# Patient Record
Sex: FEMALE | Race: WHITE | NOT HISPANIC OR LATINO | Employment: FULL TIME | URBAN - METROPOLITAN AREA
[De-identification: names, ages, dates, MRNs, and addresses within clinical notes are randomized per-mention and may not be internally consistent; named-entity substitution may affect disease eponyms.]

---

## 2018-12-22 ENCOUNTER — APPOINTMENT (OUTPATIENT)
Dept: RADIOLOGY | Facility: MEDICAL CENTER | Age: 23
End: 2018-12-22
Attending: EMERGENCY MEDICINE
Payer: COMMERCIAL

## 2018-12-22 ENCOUNTER — HOSPITAL ENCOUNTER (EMERGENCY)
Facility: MEDICAL CENTER | Age: 23
End: 2018-12-22
Attending: EMERGENCY MEDICINE
Payer: COMMERCIAL

## 2018-12-22 VITALS
RESPIRATION RATE: 18 BRPM | WEIGHT: 141.93 LBS | TEMPERATURE: 98 F | SYSTOLIC BLOOD PRESSURE: 97 MMHG | DIASTOLIC BLOOD PRESSURE: 65 MMHG | BODY MASS INDEX: 22.28 KG/M2 | OXYGEN SATURATION: 96 % | HEART RATE: 81 BPM | HEIGHT: 67 IN

## 2018-12-22 DIAGNOSIS — M25.532 LEFT WRIST PAIN: ICD-10-CM

## 2018-12-22 DIAGNOSIS — T07.XXXA MULTIPLE CONTUSIONS: ICD-10-CM

## 2018-12-22 PROCEDURE — 73110 X-RAY EXAM OF WRIST: CPT | Mod: LT

## 2018-12-22 PROCEDURE — 700102 HCHG RX REV CODE 250 W/ 637 OVERRIDE(OP): Performed by: EMERGENCY MEDICINE

## 2018-12-22 PROCEDURE — 99284 EMERGENCY DEPT VISIT MOD MDM: CPT

## 2018-12-22 PROCEDURE — A9270 NON-COVERED ITEM OR SERVICE: HCPCS | Performed by: EMERGENCY MEDICINE

## 2018-12-22 RX ORDER — ACETAMINOPHEN 325 MG/1
650 TABLET ORAL ONCE
Status: COMPLETED | OUTPATIENT
Start: 2018-12-22 | End: 2018-12-22

## 2018-12-22 RX ORDER — ESCITALOPRAM OXALATE 10 MG/1
10 TABLET ORAL DAILY
COMMUNITY
End: 2019-01-07

## 2018-12-22 RX ADMIN — ACETAMINOPHEN 650 MG: 325 TABLET, FILM COATED ORAL at 21:53

## 2018-12-22 ASSESSMENT — PAIN DESCRIPTION - DESCRIPTORS: DESCRIPTORS: THROBBING

## 2018-12-23 ASSESSMENT — ENCOUNTER SYMPTOMS
FOCAL WEAKNESS: 0
FEVER: 0
LOSS OF CONSCIOUSNESS: 0
SENSORY CHANGE: 0

## 2018-12-23 NOTE — ED NOTES
Patient ambulatory to room with steady gait. Bilateral forearms are bruised and scratched. No puncture wounds or bite marks noted. A+OX4.

## 2018-12-23 NOTE — ED TRIAGE NOTES
Chief Complaint   Patient presents with   • Dog Bite     Pt broke up dog fight today and has multiple bruises and scratches from dog bites on B UES.  C/O worst pain at L wrist up to tip of thumb.  L UE has one open scratch.  Diffuse bruising on both arms.     Pt amb to triage with steady gait for above complaint.   Pt is alert and oriented, speaking in full sentences, follows commands and responds appropriately to questions. NAD. Resp are even and unlabored.  Pt placed in lobby. Pt educated on triage process. Pt encouraged to alert staff for any changes.

## 2018-12-23 NOTE — ED PROVIDER NOTES
"ED Provider Note   12/22/2018  9:37 PM    Means of Arrival: Walk In  History obtained by: patient  Limitations:     CHIEF COMPLAINT  Chief Complaint   Patient presents with   • Dog Bite     Pt broke up dog fight today and has multiple bruises and scratches from dog bites on B UES.  C/O worst pain at L wrist up to tip of thumb.  L UE has one open scratch.  Diffuse bruising on both arms.       HPI  Peg Hdez Son is a 23 y.o. female previously healthy presenting with concerns of left wrist pain after breaking up a dog fight.  She says she was wearing a jacket is covering her skin when she broke up the dog fight.  She has multiple bruising on her arms but no significant wounds.  Her main reason for coming to the emergency room is because she is having shooting pains at her left wrist that radiate to her left thumb.    REVIEW OF SYSTEMS  Review of Systems   Constitutional: Negative for fever.   Skin:        Bruising, no open wounds   Neurological: Negative for sensory change, focal weakness and loss of consciousness.   All other systems reviewed and are negative.    See HPI for further details.     PAST MEDICAL HISTORY   has a past medical history of Anxiety and Depression.  She says vaccines are up-to-date  SOCIAL HISTORY  Social History     Social History Main Topics   • Smoking status: Current Every Day Smoker     Packs/day: 0.50   • Smokeless tobacco: Former User   • Alcohol use Yes      Comment: occ   • Drug use: Yes     Types: Inhaled      Comment: marijuana occ   • Sexual activity: Not on file       SURGICAL HISTORY  patient denies any surgical history    CURRENT MEDICATIONS  Home Medications     Reviewed by Rosalie Medina R.N. (Registered Nurse) on 12/22/18 at 2121  Med List Status: Complete   Medication Last Dose Status   escitalopram (LEXAPRO) 10 MG Tab 12/21/2018 Active                ALLERGIES  Allergies   Allergen Reactions   • Penicillamine Rash     \"skin peels off\"       PHYSICAL EXAM  VITAL SIGNS: " "BP (!) 97/65   Pulse 81   Temp 36.7 °C (98 °F) (Temporal)   Resp 18   Ht 1.702 m (5' 7\")   Wt 64.4 kg (141 lb 14.9 oz)   LMP 12/01/2018 (Approximate)   SpO2 96%   BMI 22.23 kg/m²    Pulse ox interpretation: I interpret this pulse ox as normal.  Constitutional: Alert in no apparent distress.  HENT: Normocephalic, Atraumatic, Bilateral external ears normal. Nose normal.   Eyes: Pupils are equal. Conjunctiva normal, non-icteric.   Heart: Regular rate and rythm, no murmurs.    Lungs: No respiratory distress, regular respirations. Clear to auscultation bilaterally.  Abdomen: Normal appearance, nondistended, nontender.  Skin: See muscular skeletal exam  Neurologic: Alert, see muscular skeletal exam.  MSK: Bilateral arms with multiple contusions, quite extensive.  There are no breaks in the skin.  She has tenderness at her left wrist.  Pain is reproducible with flexion and extension.  She does have full range of motion no signs of tendon disruption.  There are no neuro deficits in her hand.  She is able to make a normal fist and she has good sensation.  Brisk capillary refill in all digits.  2+ radial and ulnar pulses.  Psychiatric: Affect normal, Judgment normal, Mood normal, Appears appropriate and not intoxicated.   Physical Exam      COURSE & MEDICAL DECISION MAKING  Pertinent Labs & Imaging studies reviewed. (See chart for details)    9:37 PM This is an emergent evaluation of a 23 y.o., female who presents with pain in her left wrist after breaking up a dog fight.  Her exam reveals quite extensive contusions to bilateral forearms as she says is from dog bites.  She had an hour later on which would explain why there are no breaks in the skin.  I do not think that she needs empiric antibiotics because there are no skin breaks, significant abrasions.  We did perform an x-ray of the left wrist looking for signs of fracture, dislocation.  X-ray was negative for these findings.  I also assessed her wrist with " concerns for possible tendon injury however there is no signs of this on physical exam.  She was given Tylenol for pain.  I went over reasons for her to return the emergency department which would include signs of cellulitis/infection.    The patient will not drink alcohol nor drive with prescribed medications. The patient will return for worsening symptoms and is stable at the time of discharge. The patient verbalizes understanding.    FINAL IMPRESSION  1. Multiple contusions               Electronically signed by: Bart Rico II, 12/22/2018 9:37 PM

## 2018-12-23 NOTE — ED NOTES
D/C home with written and verbal instructions re: Rx, activity, f/u.  Verbalizes understanding.  Ambulated out with steady gait with friend.

## 2019-01-07 ENCOUNTER — HOSPITAL ENCOUNTER (EMERGENCY)
Facility: MEDICAL CENTER | Age: 24
End: 2019-01-07
Attending: EMERGENCY MEDICINE
Payer: COMMERCIAL

## 2019-01-07 VITALS
OXYGEN SATURATION: 99 % | DIASTOLIC BLOOD PRESSURE: 75 MMHG | TEMPERATURE: 98.1 F | WEIGHT: 143.3 LBS | RESPIRATION RATE: 20 BRPM | HEART RATE: 86 BPM | BODY MASS INDEX: 22.49 KG/M2 | HEIGHT: 67 IN | SYSTOLIC BLOOD PRESSURE: 123 MMHG

## 2019-01-07 DIAGNOSIS — J40 BRONCHITIS: ICD-10-CM

## 2019-01-07 PROCEDURE — 99283 EMERGENCY DEPT VISIT LOW MDM: CPT

## 2019-01-07 RX ORDER — BENZONATATE 100 MG/1
100 CAPSULE ORAL 3 TIMES DAILY PRN
Qty: 20 CAP | Refills: 0 | Status: SHIPPED | OUTPATIENT
Start: 2019-01-07 | End: 2019-04-25

## 2019-01-07 RX ORDER — AZITHROMYCIN 250 MG/1
TABLET, FILM COATED ORAL
Qty: 6 TAB | Refills: 0 | Status: SHIPPED | OUTPATIENT
Start: 2019-01-07 | End: 2019-04-25

## 2019-01-07 ASSESSMENT — PAIN SCALES - GENERAL: PAINLEVEL_OUTOF10: 5

## 2019-01-07 NOTE — ED PROVIDER NOTES
"ED Provider Note    CHIEF COMPLAINT  Chief Complaint   Patient presents with   • Cough     non productive cough X1 week       HPI  Peg Hdez Son is a 23 y.o. female who presents to the emergency department complaining of 1 week of progressively worsening cough scratchy voice body aches and on and off fever.  The patient's fiancé is also ill with similar symptoms.  The patient does smoke and says she has had a history of bronchitis in the past but she says because of the way she is feeling she has stopped smoking over the last couple of days.    REVIEW OF SYSTEMS no chest pain no hemoptysis no vomiting or diarrhea.  All other systems negative    PAST MEDICAL HISTORY  Past Medical History:   Diagnosis Date   • Anxiety    • Depression        FAMILY HISTORY  History reviewed. No pertinent family history.    SOCIAL HISTORY  Social History     Social History   • Marital status: Single     Spouse name: N/A   • Number of children: N/A   • Years of education: N/A     Social History Main Topics   • Smoking status: Current Every Day Smoker     Packs/day: 0.50   • Smokeless tobacco: Former User   • Alcohol use Yes      Comment: occ   • Drug use: Yes     Types: Inhaled      Comment: marijuana occ   • Sexual activity: Not on file     Other Topics Concern   • Not on file     Social History Narrative   • No narrative on file       SURGICAL HISTORY  History reviewed. No pertinent surgical history.    CURRENT MEDICATIONS  Home Medications    **Home medications have not yet been reviewed for this encounter**         ALLERGIES  Allergies   Allergen Reactions   • Penicillamine Rash     \"skin peels off\"       PHYSICAL EXAM  VITAL SIGNS: /75   Pulse 86   Temp 36.7 °C (98.1 °F) (Temporal)   Resp 20   Ht 1.702 m (5' 7\")   Wt 65 kg (143 lb 4.8 oz)   LMP 12/07/2018 (Approximate)   SpO2 99%   BMI 22.44 kg/m²    Oxygen saturation is interpreted as adequate  Constitutional: Awake and nontoxic-appearing  HENT: Mucous membranes " are moist throat clear, the patient does have a scratchy voice  Eyes: No erythema discharge or jaundice  Neck: Trachea midline no JVD no meningeal findings  Cardiovascular: Regular rate and rhythm  Lungs: Clear and equal bilaterally with no apparent difficulty breathing  Skin: Warm and dry  Musculoskeletal: No acute bony deformity  Neurologic: Awake verbal ambulatory    MEDICAL DECISION MAKING and DISPOSITION  I have reviewed the file versus bacterial etiology of illness with the patient, I have offered to test her for influenza.  After some discussion we have decided to treat her empirically with azithromycin and Tessalon.  I have written prescriptions for her I have advised her to rest at home and drink lots of fluids to maintain hydration she is to use Tylenol and Motrin if needed for fever or discomfort and she is to return here if she develops new or worsening symptoms    IMPRESSION  1.  Acute bronchitis      Electronically signed by: Lucio Leiva, 1/7/2019 10:23 AM

## 2019-01-07 NOTE — ED NOTES
Discharged to home with instructions to follow up with her doctor and/or return here for worsening symptom.  Prescriptions given to patient

## 2019-01-07 NOTE — ED NOTES
Ambulatory to room.  Agree with triage assessment. Cough accompanied by headache, worse when coughing.  Fiance recently Dx with viral URI.  No acute distress. Changed into gown.  Chart placed for ERP eval.

## 2019-01-07 NOTE — DISCHARGE INSTRUCTIONS
Rest at home, drink lots of fluids to maintain hydration, use Tylenol and Motrin if needed for fever or discomfort.  Return here if you are developing new or worsening symptoms.  Stop smoking.

## 2019-03-06 ENCOUNTER — TELEPHONE (OUTPATIENT)
Dept: SCHEDULING | Facility: IMAGING CENTER | Age: 24
End: 2019-03-06

## 2019-04-25 ENCOUNTER — OFFICE VISIT (OUTPATIENT)
Dept: MEDICAL GROUP | Facility: MEDICAL CENTER | Age: 24
End: 2019-04-25
Payer: COMMERCIAL

## 2019-04-25 VITALS
WEIGHT: 149 LBS | BODY MASS INDEX: 23.39 KG/M2 | DIASTOLIC BLOOD PRESSURE: 72 MMHG | HEART RATE: 76 BPM | RESPIRATION RATE: 16 BRPM | OXYGEN SATURATION: 98 % | TEMPERATURE: 98.4 F | SYSTOLIC BLOOD PRESSURE: 122 MMHG | HEIGHT: 67 IN

## 2019-04-25 DIAGNOSIS — Z80.49 FAMILY HISTORY OF CERVICAL CANCER: ICD-10-CM

## 2019-04-25 DIAGNOSIS — F33.42 RECURRENT MAJOR DEPRESSIVE DISORDER, IN FULL REMISSION (HCC): ICD-10-CM

## 2019-04-25 DIAGNOSIS — Z84.81 FAMILY HISTORY OF BRCA GENE POSITIVE: ICD-10-CM

## 2019-04-25 DIAGNOSIS — G43.119 INTRACTABLE MIGRAINE WITH AURA WITHOUT STATUS MIGRAINOSUS: ICD-10-CM

## 2019-04-25 DIAGNOSIS — Z00.00 ANNUAL PHYSICAL EXAM: ICD-10-CM

## 2019-04-25 DIAGNOSIS — F41.8 DEPRESSION WITH ANXIETY: ICD-10-CM

## 2019-04-25 DIAGNOSIS — Z80.3 FAMILY HISTORY OF BREAST CANCER IN MOTHER: ICD-10-CM

## 2019-04-25 PROBLEM — F32.9 MAJOR DEPRESSIVE DISORDER: Status: ACTIVE | Noted: 2019-04-25

## 2019-04-25 PROCEDURE — 99204 OFFICE O/P NEW MOD 45 MIN: CPT | Performed by: NURSE PRACTITIONER

## 2019-04-25 RX ORDER — SUMATRIPTAN 50 MG/1
50 TABLET, FILM COATED ORAL
Qty: 10 TAB | Refills: 3 | Status: SHIPPED | OUTPATIENT
Start: 2019-04-25

## 2019-04-25 RX ORDER — ESCITALOPRAM OXALATE 10 MG/1
10 TABLET ORAL DAILY
Qty: 30 TAB | Refills: 11 | Status: SHIPPED | OUTPATIENT
Start: 2019-04-25

## 2019-04-25 RX ORDER — KETOROLAC TROMETHAMINE 30 MG/ML
60 INJECTION, SOLUTION INTRAMUSCULAR; INTRAVENOUS ONCE
Status: COMPLETED | OUTPATIENT
Start: 2019-04-25 | End: 2019-04-25

## 2019-04-25 RX ORDER — ESCITALOPRAM OXALATE 10 MG/1
10 TABLET ORAL DAILY
COMMUNITY
End: 2019-04-25 | Stop reason: SDUPTHER

## 2019-04-25 RX ADMIN — KETOROLAC TROMETHAMINE 60 MG: 30 INJECTION, SOLUTION INTRAMUSCULAR; INTRAVENOUS at 09:49

## 2019-04-25 ASSESSMENT — PATIENT HEALTH QUESTIONNAIRE - PHQ9
CLINICAL INTERPRETATION OF PHQ2 SCORE: 2
SUM OF ALL RESPONSES TO PHQ QUESTIONS 1-9: 17
5. POOR APPETITE OR OVEREATING: 2 - MORE THAN HALF THE DAYS

## 2019-04-25 NOTE — PROGRESS NOTES
Peg Del Angel is a 24 y.o. female here to establish care and discuss the following:  HPI:    Intractable migraine with aura without status migrainosus  Started about 1 month ago. Occurring daily, lasting a few hours. Taking Ibuprofen 800 mg once, slightly improves. Symptoms include photophobia, phonophobia, nausea no vomiting, occ paresthesias, occ floaters in vision. Does get nausea and floaters/flashing prior to headache.     Did have Chronic migraines from age [age] due to a chemical exposure, feels like they decreased or stopped up until recently.     Does get neck and shoulder tension, occ dizziness.     Family history of BRCA gene positive  Mother was diagnosed with BRCA + breast cancer and cervical cancer in her 20s-30s. Patient has not had testing.     Depression with anxiety  Chronic. Stable on escitalopram 10mg daily. Requesting refills today.     Current medicines (including changes today)  Current Outpatient Prescriptions   Medication Sig Dispense Refill   • SUMAtriptan (IMITREX) 50 MG Tab Take 1 Tab by mouth Once PRN for Migraine for up to 1 dose. May repeat dose once if symptoms persist past 2 hours 10 Tab 3   • escitalopram (LEXAPRO) 10 MG Tab Take 1 Tab by mouth every day. 30 Tab 11     No current facility-administered medications for this visit.      She  has a past medical history of Anxiety and Depression.  She  has no past surgical history on file.  Social History   Substance Use Topics   • Smoking status: Current Every Day Smoker     Packs/day: 0.25     Years: 5.00   • Smokeless tobacco: Former User   • Alcohol use Yes      Comment: OCC 1/month, previously heavy     Social History     Social History Narrative   • No narrative on file     Family History   Problem Relation Age of Onset   • Cancer Mother         breast (20-30), cervical (20-30) + BRCA   • Heart Disease Mother    • Stroke Mother    • Seizures Mother    • Other Father         joints   • Blood Disease Father         Hemachromatosis  "  • Other Sister         CP   • Lung Disease Brother    • Psychiatry Brother      Family Status   Relation Status   • Mo Alive   • Fa Alive   • Sis Alive   • Bro Alive         ROS  No chest pain, no abdominal pain, no rash.  Positive ROS as per HPI.  All other systems reviewed and are negative      Objective:     /72 (BP Location: Right arm, Patient Position: Sitting, BP Cuff Size: Adult)   Pulse 76   Temp 36.9 °C (98.4 °F) (Temporal)   Resp 16   Ht 1.702 m (5' 7.01\")   Wt 67.6 kg (149 lb)   SpO2 98%  Body mass index is 23.33 kg/m².     Physical Exam:    Constitutional: Alert, no distress.  Skin: Warm, dry, good turgor, no rashes in visible areas.  Eye: Equal, round, conjunctiva clear, lids normal.  ENMT: Lips without lesions, good dentition  Neck: Trachea midline  Respiratory: Unlabored respiratory effort, lungs clear to auscultation, no wheezes, no ronchi.  Cardiovascular: Normal S1, S2, no murmur, no edema.  Psych: Alert and oriented x3, normal affect and mood.      Assessment and Plan:   The following treatment plan was discussed    1. Intractable migraine with aura without status migrainosus  Unstable  toradol IM 60 mg today  Trial of imitrex as needed  If frequent migraines persist, consider preventative mediation  - SUMAtriptan (IMITREX) 50 MG Tab; Take 1 Tab by mouth Once PRN for Migraine for up to 1 dose. May repeat dose once if symptoms persist past 2 hours  Dispense: 10 Tab; Refill: 3  - ketorolac (TORADOL) injection 60 mg; 2 mL by Intramuscular route Once.    2. Depression with anxiety  Stable  Medication refilled  - escitalopram (LEXAPRO) 10 MG Tab; Take 1 Tab by mouth every day.  Dispense: 30 Tab; Refill: 11  - Comp Metabolic Panel; Future  - TSH WITH REFLEX TO FT4; Future    3. Family history of BRCA gene positive  - BRCA1 AND BRCA2)SEQUENCING AND DELETION/DUPLICATION; Future  - MA-SCREEN MAMMO W/CAD-BILAT; Future    4. Family history of breast cancer in mother  - BRCA1 AND " BRCA2)SEQUENCING AND DELETION/DUPLICATION; Future  - MA-SCREEN MAMMO W/CAD-BILAT; Future    5. Family history of cervical cancer  - BRCA1 AND BRCA2)SEQUENCING AND DELETION/DUPLICATION; Future    6. Annual physical exam  - CBC WITH DIFFERENTIAL; Future  - Comp Metabolic Panel; Future  - Lipid Profile; Future  - TSH WITH REFLEX TO FT4; Future  - VITAMIN D,25 HYDROXY; Future      Followup: Return in about 4 weeks (around 5/23/2019) for Annual, Review Labs.    I have placed the below orders and discussed them with an approved delegating provider. The MA is performing the below orders under the direction of Dr. Tay

## 2019-04-25 NOTE — ASSESSMENT & PLAN NOTE
Mother was diagnosed with BRCA + breast cancer and cervical cancer in her 20s-30s. Patient has not had testing.

## 2019-04-25 NOTE — ASSESSMENT & PLAN NOTE
Started about 1 month ago. Occurring daily, lasting a few hours. Taking Ibuprofen 800 mg once, slightly improves. Symptoms include photophobia, phonophobia, nausea no vomiting, occ paresthesias, occ floaters in vision. Does get nausea and floaters/flashing prior to headache.     Did have Chronic migraines from age [age] due to a chemical exposure, feels like they decreased or stopped up until recently.     Does get neck and shoulder tension, occ dizziness.

## 2019-05-01 ENCOUNTER — HOSPITAL ENCOUNTER (OUTPATIENT)
Dept: LAB | Facility: MEDICAL CENTER | Age: 24
End: 2019-05-01
Attending: NURSE PRACTITIONER
Payer: COMMERCIAL

## 2019-05-01 DIAGNOSIS — F41.8 DEPRESSION WITH ANXIETY: ICD-10-CM

## 2019-05-01 DIAGNOSIS — Z00.00 ANNUAL PHYSICAL EXAM: ICD-10-CM

## 2019-05-01 LAB
25(OH)D3 SERPL-MCNC: 12 NG/ML (ref 30–100)
ALBUMIN SERPL BCP-MCNC: 4.1 G/DL (ref 3.2–4.9)
ALBUMIN/GLOB SERPL: 1.6 G/DL
ALP SERPL-CCNC: 33 U/L (ref 30–99)
ALT SERPL-CCNC: 8 U/L (ref 2–50)
ANION GAP SERPL CALC-SCNC: 8 MMOL/L (ref 0–11.9)
AST SERPL-CCNC: 16 U/L (ref 12–45)
BASOPHILS # BLD AUTO: 0.5 % (ref 0–1.8)
BASOPHILS # BLD: 0.02 K/UL (ref 0–0.12)
BILIRUB SERPL-MCNC: 0.8 MG/DL (ref 0.1–1.5)
BUN SERPL-MCNC: 8 MG/DL (ref 8–22)
CALCIUM SERPL-MCNC: 8.8 MG/DL (ref 8.5–10.5)
CHLORIDE SERPL-SCNC: 109 MMOL/L (ref 96–112)
CHOLEST SERPL-MCNC: 136 MG/DL (ref 100–199)
CO2 SERPL-SCNC: 24 MMOL/L (ref 20–33)
CREAT SERPL-MCNC: 0.74 MG/DL (ref 0.5–1.4)
EOSINOPHIL # BLD AUTO: 0.08 K/UL (ref 0–0.51)
EOSINOPHIL NFR BLD: 1.9 % (ref 0–6.9)
ERYTHROCYTE [DISTWIDTH] IN BLOOD BY AUTOMATED COUNT: 45.9 FL (ref 35.9–50)
FASTING STATUS PATIENT QL REPORTED: NORMAL
GLOBULIN SER CALC-MCNC: 2.6 G/DL (ref 1.9–3.5)
GLUCOSE SERPL-MCNC: 93 MG/DL (ref 65–99)
HCT VFR BLD AUTO: 40.5 % (ref 37–47)
HDLC SERPL-MCNC: 52 MG/DL
HGB BLD-MCNC: 13.5 G/DL (ref 12–16)
IMM GRANULOCYTES # BLD AUTO: 0.01 K/UL (ref 0–0.11)
IMM GRANULOCYTES NFR BLD AUTO: 0.2 % (ref 0–0.9)
LDLC SERPL CALC-MCNC: 75 MG/DL
LYMPHOCYTES # BLD AUTO: 0.95 K/UL (ref 1–4.8)
LYMPHOCYTES NFR BLD: 22.7 % (ref 22–41)
MCH RBC QN AUTO: 31.1 PG (ref 27–33)
MCHC RBC AUTO-ENTMCNC: 33.3 G/DL (ref 33.6–35)
MCV RBC AUTO: 93.3 FL (ref 81.4–97.8)
MONOCYTES # BLD AUTO: 0.27 K/UL (ref 0–0.85)
MONOCYTES NFR BLD AUTO: 6.4 % (ref 0–13.4)
NEUTROPHILS # BLD AUTO: 2.86 K/UL (ref 2–7.15)
NEUTROPHILS NFR BLD: 68.3 % (ref 44–72)
NRBC # BLD AUTO: 0 K/UL
NRBC BLD-RTO: 0 /100 WBC
PLATELET # BLD AUTO: 155 K/UL (ref 164–446)
PMV BLD AUTO: 11.1 FL (ref 9–12.9)
POTASSIUM SERPL-SCNC: 4.2 MMOL/L (ref 3.6–5.5)
PROT SERPL-MCNC: 6.7 G/DL (ref 6–8.2)
RBC # BLD AUTO: 4.34 M/UL (ref 4.2–5.4)
SODIUM SERPL-SCNC: 141 MMOL/L (ref 135–145)
TRIGL SERPL-MCNC: 44 MG/DL (ref 0–149)
TSH SERPL DL<=0.005 MIU/L-ACNC: 0.95 UIU/ML (ref 0.38–5.33)
WBC # BLD AUTO: 4.2 K/UL (ref 4.8–10.8)

## 2019-05-01 PROCEDURE — 36415 COLL VENOUS BLD VENIPUNCTURE: CPT

## 2019-05-01 PROCEDURE — 85025 COMPLETE CBC W/AUTO DIFF WBC: CPT

## 2019-05-01 PROCEDURE — 80061 LIPID PANEL: CPT

## 2019-05-01 PROCEDURE — 80053 COMPREHEN METABOLIC PANEL: CPT

## 2019-05-01 PROCEDURE — 82306 VITAMIN D 25 HYDROXY: CPT

## 2019-05-01 PROCEDURE — 84443 ASSAY THYROID STIM HORMONE: CPT

## 2019-05-02 ENCOUNTER — TELEPHONE (OUTPATIENT)
Dept: MEDICAL GROUP | Facility: MEDICAL CENTER | Age: 24
End: 2019-05-02

## 2019-06-04 ENCOUNTER — HOSPITAL ENCOUNTER (OUTPATIENT)
Dept: RADIOLOGY | Facility: MEDICAL CENTER | Age: 24
End: 2019-06-04
Attending: NURSE PRACTITIONER
Payer: COMMERCIAL

## 2019-06-04 DIAGNOSIS — Z84.81 FAMILY HISTORY OF BRCA GENE POSITIVE: ICD-10-CM

## 2019-06-04 DIAGNOSIS — Z80.3 FAMILY HISTORY OF BREAST CANCER IN MOTHER: ICD-10-CM

## 2019-06-04 PROCEDURE — 77067 SCR MAMMO BI INCL CAD: CPT

## 2019-06-12 ENCOUNTER — TELEPHONE (OUTPATIENT)
Dept: MEDICAL GROUP | Facility: MEDICAL CENTER | Age: 24
End: 2019-06-12

## 2019-06-13 NOTE — TELEPHONE ENCOUNTER
----- Message from RONALD Briceño sent at 6/12/2019  4:55 PM PDT -----  Patient's mammogram is normal but shows dense breast tissue. That makes it difficult to detect small abnormalities with a normal mammogram. There is another test- sonocine- that is better for women with dense breasts. This, unfortunately, is not covered by insurance and costs about $125. If she would like to have this done I will arrange it, let me know. Otherwise plan to repeat mammogram in 1 year.    RONALD Briceño